# Patient Record
Sex: FEMALE | Race: BLACK OR AFRICAN AMERICAN | NOT HISPANIC OR LATINO | URBAN - METROPOLITAN AREA
[De-identification: names, ages, dates, MRNs, and addresses within clinical notes are randomized per-mention and may not be internally consistent; named-entity substitution may affect disease eponyms.]

---

## 2023-01-09 ENCOUNTER — APPOINTMENT (RX ONLY)
Dept: URBAN - METROPOLITAN AREA OTHER 9 | Facility: OTHER | Age: 78
Setting detail: DERMATOLOGY
End: 2023-01-09

## 2023-01-09 DIAGNOSIS — L65.0 TELOGEN EFFLUVIUM: ICD-10-CM

## 2023-01-09 PROCEDURE — 99203 OFFICE O/P NEW LOW 30 MIN: CPT

## 2023-01-09 PROCEDURE — ? ADDITIONAL NOTES

## 2023-01-09 PROCEDURE — ? COUNSELING

## 2023-01-09 PROCEDURE — ? TREATMENT REGIMEN

## 2023-01-09 ASSESSMENT — LOCATION SIMPLE DESCRIPTION DERM: LOCATION SIMPLE: POSTERIOR SCALP

## 2023-01-09 ASSESSMENT — LOCATION DETAILED DESCRIPTION DERM: LOCATION DETAILED: POSTERIOR MID-PARIETAL SCALP

## 2023-01-09 ASSESSMENT — LOCATION ZONE DERM: LOCATION ZONE: SCALP

## 2023-01-09 NOTE — HPI: HAIR LOSS
How Did The Hair Loss Occur?: gradual in onset
How Severe Is Your Hair Loss?: mild
Additional History: Patient reports after her  death, she had been dealing with hair loss in the scalp for about 6 months. Her hair began to fall out and the next 3 months the hair began to grow back with a clear midline and slower growth. She is using certain products for her hair such as crème of nature by revlon, creamy oil moisturizing hair lotion, argan oil and other products. She saw her PCP ( Dr. Yoana Moon ) who did blood work on 12/14/22 which came back normal. \\n

## 2023-01-09 NOTE — PROCEDURE: TREATMENT REGIMEN
Otc Regimen: - recommended 5% Mens strength Rogaine foam, aerosol, or solution qd scalp\\n- PO Nutrafol supplement for post menopausal women qd
Detail Level: Zone
Plan: Discussed HairStim possibly in the future but will start with supplements for now.

## 2023-01-09 NOTE — PROCEDURE: ADDITIONAL NOTES
Additional Notes: Patient was 54 when she went through menopause.
Detail Level: Simple
Render Risk Assessment In Note?: no